# Patient Record
Sex: MALE | Race: ASIAN | Employment: OTHER
[De-identification: names, ages, dates, MRNs, and addresses within clinical notes are randomized per-mention and may not be internally consistent; named-entity substitution may affect disease eponyms.]

---

## 2024-03-11 NOTE — THERAPY EVALUATION
ALEXANDER HOYOS [facility] ([place], [place]) - [facility]  [address] [place] [address]  Phone: [phone]   [phone]  Plan of Care / Statement of Necessity for Physical Therapy Services     Patient Name: Amos Oquendo : 1960   Medical   Diagnosis: Pain in left shoulder [M25.512] Treatment Diagnosis:  M25.512  LEFT SHOULDER PAIN    Onset Date: ~1 month     Referral Source: Bartolome Grande MD Start of Care (SOC): 3/12/2024   Prior Hospitalization: See medical history Provider #: [id number]   Prior Level of Function: Enjoys gardening; works out 4-5 days/week (running, 50 pushups, 50 sit-ups)   Comorbidities: Hx MI  (cardiac stents); pre-diabetes, HTN, LBP     Assessment / key information: Pt is a 65 y/o LHD M who presents to PT w/ c/o L shoulder pain, that has been present for about 1 month. Unsure exact KATHY but states he had been doing more push-ups around the time. Pt notes pain ranges 3 to 10/10, made worse with reaching overhead, laying on the L side, reaching behind back; better with avoidance of aggravating activities. Describes pain as sharp/pinch, located anterior shoulder and axilla. Also endorses some clicking as well as occasional night pain that wakes him up.Testing/imaging has included nothing to date. Prior treatment has included prednisone with some improvement. Red flags negative. FOTO 50     Clinical Exam Findings:  POSTURE/OBSERVATION: relative flattening of t/s kyphosis. Mild anterior scapular tilt, GHJ IR; medial scapular winging L>R  C/s AROM WNL and pain-free all planes   STRENGTH AROM PROM   Shoulder Left Right Left  (Seated) Right  (Seated) Left Right   Flexion 4-/5 p! 4/5 144 p! 165  152 p!    Extension 4-/5 [p! 4-/5 40 p! 60       Abduction 3-/5 p! 4/5 90 p! 162  107 p!     ER (scap plane)  3+/5 p!  4/5 60 p! 60       IR (scap plane)  4-/5 p! 5/5 FIR to L iliac crest p! FIR T9       Elbow Left Right Left Right Left Right   Extension 4-/5 p! 4/5     
functional mobility deficits, analyze and address ROM deficits, analyze and address strength deficits, analyze and address soft tissue restrictions, analyze and cue for proper movement patterns, analyze and modify for postural abnormalities, and instruct in home and community integration to address functional deficits and attain remaining goals.    Progress toward goals / Updated goals:  [x]  See POC    Next PN/ RC due PN due 3/12/24, RC due 4/12/24  Auth due CHRISTIANNE    PLAN  yes Continue plan of care  []  Other:      Brina Siddiqui PT    3/11/2024    11:26 AM    Future Appointments   Date Time Provider Department Center   3/12/2024 10:20 AM Brina Siddiqui, PT [id number]

## 2024-03-12 ENCOUNTER — HOSPITAL ENCOUNTER (OUTPATIENT)
Facility: HOSPITAL | Age: 64
Setting detail: RECURRING SERIES
Discharge: HOME | End: 2024-03-15
Payer: MEDICARE

## 2024-03-12 PROCEDURE — 97535 SELF CARE MNGMENT TRAINING: CPT

## 2024-03-12 PROCEDURE — 97162 PT EVAL MOD COMPLEX 30 MIN: CPT

## 2024-03-21 ENCOUNTER — APPOINTMENT (OUTPATIENT)
Facility: HOSPITAL | Age: 64
End: 2024-03-21
Payer: MEDICARE

## 2024-03-26 NOTE — PROGRESS NOTES
PHYSICAL / OCCUPATIONAL THERAPY - DAILY TREATMENT NOTE    Patient Name: Amos Oquendo    Date: 3/28/2024    : 1960  Insurance: Payor: MEDICARE / Plan: MEDICARE PART A AND B / Product Type: *No Product type* /      Patient  verified Yes     Visit #   Current / Total 2 16   Time   In / Out 11:44 12:35   Pain   In / Out 8 6-7   Subjective Functional Status/Changes: Pt reports compliance with HEP. Pt c/o frequent shoulder pain especially, ant/sup and also to post shoulder. States his arm is painful at night and unable to lie on L side. Pt reports occasional clicking     TREATMENT AREA =  Pain in left shoulder [M25.512]    OBJECTIVE    Modalities Rationale:     decrease inflammation and decrease pain to improve patient's ability to progress to PLOF and address remaining functional goals.     min [] Estim Unattended, type/location:                                      []  w/ice    []  w/heat    min [] Estim Attended, type/location:                                     []  w/US     []  w/ice    []  w/heat    []  TENS insruct      min []  Mechanical Traction: type/lbs                   []  pro   []  sup   []  int   []  cont    []  before manual    []  after manual    min []  Ultrasound, settings/location:     10 min  unbill [x]  Ice     []  Heat    location/position: L shoulder, reclined long sitting    min []  Paraffin,  details:     min []  Vasopneumatic Device, press/temp:     min []  Whirlpool / Fluido:    If using vaso (only need to measure limb vaso being performed on)      pre-treatment girth :       post-treatment girth :       measured at (landmark location) :      min []  Other:    Skin assessment post-treatment:   Intact     Therapeutic Procedures:    Tx Min Billable or 1:1 Min (if diff from Tx Min) Procedure, Rationale, Specifics   [id number] Therapeutic Exercise (timed):  increase ROM, strength, coordination, balance, and proprioception to improve patient's ability to progress to PLOF and address

## 2024-03-28 ENCOUNTER — HOSPITAL ENCOUNTER (OUTPATIENT)
Facility: HOSPITAL | Age: 64
Setting detail: RECURRING SERIES
Discharge: HOME | End: 2024-03-31
Payer: MEDICARE

## 2024-03-28 PROCEDURE — 97110 THERAPEUTIC EXERCISES: CPT

## 2024-03-28 PROCEDURE — 97112 NEUROMUSCULAR REEDUCATION: CPT

## 2024-03-28 PROCEDURE — 97140 MANUAL THERAPY 1/> REGIONS: CPT
